# Patient Record
Sex: MALE
[De-identification: names, ages, dates, MRNs, and addresses within clinical notes are randomized per-mention and may not be internally consistent; named-entity substitution may affect disease eponyms.]

---

## 2023-04-06 ENCOUNTER — APPOINTMENT (OUTPATIENT)
Dept: NEUROLOGY | Facility: CLINIC | Age: 63
End: 2023-04-06

## 2023-08-10 ENCOUNTER — APPOINTMENT (OUTPATIENT)
Dept: UROLOGY | Facility: CLINIC | Age: 63
End: 2023-08-10
Payer: COMMERCIAL

## 2023-08-10 VITALS — HEART RATE: 69 BPM | SYSTOLIC BLOOD PRESSURE: 108 MMHG | DIASTOLIC BLOOD PRESSURE: 72 MMHG

## 2023-08-10 DIAGNOSIS — R39.12 POOR URINARY STREAM: ICD-10-CM

## 2023-08-10 DIAGNOSIS — N40.1 BENIGN PROSTATIC HYPERPLASIA WITH LOWER URINARY TRACT SYMPMS: ICD-10-CM

## 2023-08-10 DIAGNOSIS — R35.1 NOCTURIA: ICD-10-CM

## 2023-08-10 DIAGNOSIS — Z78.9 OTHER SPECIFIED HEALTH STATUS: ICD-10-CM

## 2023-08-10 DIAGNOSIS — N13.8 BENIGN PROSTATIC HYPERPLASIA WITH LOWER URINARY TRACT SYMPMS: ICD-10-CM

## 2023-08-10 PROCEDURE — 99204 OFFICE O/P NEW MOD 45 MIN: CPT

## 2023-08-10 PROCEDURE — 51798 US URINE CAPACITY MEASURE: CPT

## 2023-08-10 RX ORDER — ALLOPURINOL 200 MG/1
TABLET ORAL
Refills: 0 | Status: ACTIVE | COMMUNITY

## 2023-08-10 RX ORDER — TAMSULOSIN HYDROCHLORIDE 0.4 MG/1
0.4 CAPSULE ORAL
Qty: 30 | Refills: 3 | Status: ACTIVE | COMMUNITY
Start: 2023-08-10 | End: 1900-01-01

## 2023-08-11 LAB
APPEARANCE: CLEAR
BACTERIA: NEGATIVE /HPF
BILIRUBIN URINE: NEGATIVE
BLOOD URINE: NEGATIVE
CAST: 1 /LPF
COLOR: YELLOW
EPITHELIAL CELLS: 0 /HPF
GLUCOSE QUALITATIVE U: NEGATIVE MG/DL
KETONES URINE: NEGATIVE MG/DL
LEUKOCYTE ESTERASE URINE: NEGATIVE
MICROSCOPIC-UA: NORMAL
NITRITE URINE: NEGATIVE
PH URINE: 5.5
PROTEIN URINE: NEGATIVE MG/DL
RED BLOOD CELLS URINE: 1 /HPF
SPECIFIC GRAVITY URINE: 1.02
UROBILINOGEN URINE: 0.2 MG/DL
WHITE BLOOD CELLS URINE: 0 /HPF

## 2023-08-12 NOTE — HISTORY OF PRESENT ILLNESS
[FreeTextEntry1] : New BPH.  PVR 0.  Flomax.  PSA.  Follow-up 1 month.  Please refer to URO Consult note

## 2023-08-12 NOTE — LETTER BODY
[FreeTextEntry1] : Mel Burton MD [address], [place] [address] [phone]  Dear Dr. Burton,  Reason for Visit: BPH.  This is a 62 year-old gentleman with symptoms of BPH. Patient is here today for evaluation. Patient reports he has weak uroflow, frequency, and hesitancy. He denies any hematuria or urinary incontinence. His symptoms are aggravated by hydration. He denies any alleviating factors. He has not tried any medical therapy previously. He reports no pain. All other review of systems are negative. He has no cancer in his family medical history. He has no previous surgical history. Past medical history, family history and social history were inquired and were noncontributory to current condition. The patient does not use tobacco or drink alcohol. Medications and allergies were reviewed. He has no known allergies to medication.  On examination, the patient is a healthy-appearing gentleman in no acute distress. He is alert and oriented follows commands. He has normal mood and affect. He is normocephalic. Neck is supple. Oral no thrush Respirations are unlabored. His abdomen is soft and nontender. Bladder is nonpalpable. No CVA tenderness. Neurologically he is grossly intact. No peripheral edema. Skin without gross abnormality. He has normal male external genitalia. Normal meatus. Bilateral testes are descended intrascrotally and normal to palpation. On rectal examination, there is normal sphincter tone. The prostate is clinically benign without focal induration or nodularity.  Post-void residual on bladder scan today was 0 cc.  ASSESSMENT: BPH.   I counseled the patient on the various etiology of his symptoms. I discussed the natural history of BPH and the treatment options available. I discussed the options of conservative management with fluid in dietary restrictions, herbal therapy, medical therapy, and minimally invasive procedures. Risk and benefits were discussed. I answered his questions. I recommended he try Flomax. I discussed the potential side effects of the medication. I counseled the patient on its use and side effects. If the patient develops any side effects, the patient will discontinue the medication and contact me. He will repeat PSA and BMP to establish baseline. Risks and alternatives were discussed. I answered the patient questions. The patient will follow-up as directed and will contact me with any questions or concerns. Thank you for the opportunity to participate in the care of Mr. VELIZ. I will keep you updated on his progress.   Plan: Trial of Flomax. PSA. BMP. Follow up in 1 month.

## 2023-08-12 NOTE — ADDENDUM
[FreeTextEntry1] : Entered by Do Ayala, acting as scribe for Dr. Camacho Reed. The documentation recorded by the scribe accurately reflects the service I personally performed and the decisions made by me.

## 2023-08-12 NOTE — LETTER BODY
[FreeTextEntry1] : Mel Burton MD [address], [place] [address] [phone]  Dear Dr. Burton,  Reason for Visit: BPH.  This is a 62 year-old gentleman with symptoms of BPH. Patient is here today for evaluation. Patient reports he has weak uroflow, frequency, and hesitancy. He denies any hematuria or urinary incontinence. His symptoms are aggravated by hydration. He denies any alleviating factors. He has not tried any medical therapy previously. He reports no pain. All other review of systems are negative. He has no cancer in his family medical history. He has no previous surgical history. Past medical history, family history and social history were inquired and were noncontributory to current condition. The patient does not use tobacco or drink alcohol. Medications and allergies were reviewed. He has no known allergies to medication.  On examination, the patient is a healthy-appearing gentleman in no acute distress. He is alert and oriented follows commands. He has normal mood and affect. He is normocephalic. Neck is supple. Oral no thrush Respirations are unlabored. His abdomen is soft and nontender. Bladder is nonpalpable. No CVA tenderness. Neurologically he is grossly intact. No peripheral edema. Skin without gross abnormality. He has normal male external genitalia. Normal meatus. Bilateral testes are descended intrascrotally and normal to palpation. On rectal examination, there is normal sphincter tone. The prostate is clinically benign without focal induration or nodularity.  Post-void residual on bladder scan today was 0 cc.  ASSESSMENT: BPH.   I counseled the patient on the various etiology of his symptoms. I discussed the natural history of BPH and the treatment options available. I discussed the options of conservative management with fluid in dietary restrictions, herbal therapy, medical therapy, and minimally invasive procedures. Risk and benefits were discussed. I answered his questions. I recommended he try Flomax. I discussed the potential side effects of the medication. I counseled the patient on its use and side effects. If the patient develops any side effects, the patient will discontinue the medication and contact me. He will repeat PSA and BMP to establish baseline. Risks and alternatives were discussed. I answered the patient questions. The patient will follow-up as directed and will contact me with any questions or concerns. Thank you for the opportunity to participate in the care of Mr. VELIZ. I will keep you updated on his progress.   Plan: Trial of Flomax. PSA. BMP. Follow up in 1 month.

## 2024-03-13 ENCOUNTER — EMERGENCY (EMERGENCY)
Facility: HOSPITAL | Age: 64
LOS: 1 days | Discharge: HOME | End: 2024-03-13
Attending: EMERGENCY MEDICINE
Payer: COMMERCIAL

## 2024-03-13 VITALS
SYSTOLIC BLOOD PRESSURE: 116 MMHG | DIASTOLIC BLOOD PRESSURE: 79 MMHG | TEMPERATURE: 97 F | RESPIRATION RATE: 20 BRPM | OXYGEN SATURATION: 95 % | HEART RATE: 70 BPM | WEIGHT: 190.04 LBS

## 2024-03-13 LAB
ALBUMIN SERPL ELPH-MCNC: 4 G/DL — SIGNIFICANT CHANGE UP (ref 3.3–5)
ALP SERPL-CCNC: 63 U/L — SIGNIFICANT CHANGE UP (ref 40–120)
ALT FLD-CCNC: 17 U/L — SIGNIFICANT CHANGE UP (ref 10–45)
ANION GAP SERPL CALC-SCNC: 15 MMOL/L — SIGNIFICANT CHANGE UP (ref 5–17)
AST SERPL-CCNC: 28 U/L — SIGNIFICANT CHANGE UP (ref 10–40)
BASE EXCESS BLDV CALC-SCNC: 1.6 MMOL/L — SIGNIFICANT CHANGE UP (ref -2–3)
BASOPHILS # BLD AUTO: 0.05 K/UL — SIGNIFICANT CHANGE UP (ref 0–0.2)
BASOPHILS NFR BLD AUTO: 0.7 % — SIGNIFICANT CHANGE UP (ref 0–2)
BILIRUB SERPL-MCNC: 0.3 MG/DL — SIGNIFICANT CHANGE UP (ref 0.2–1.2)
BUN SERPL-MCNC: 16 MG/DL — SIGNIFICANT CHANGE UP (ref 7–23)
CA-I SERPL-SCNC: 1.2 MMOL/L — SIGNIFICANT CHANGE UP (ref 1.15–1.33)
CALCIUM SERPL-MCNC: 9.3 MG/DL — SIGNIFICANT CHANGE UP (ref 8.4–10.5)
CHLORIDE BLDV-SCNC: 105 MMOL/L — SIGNIFICANT CHANGE UP (ref 96–108)
CHLORIDE SERPL-SCNC: 104 MMOL/L — SIGNIFICANT CHANGE UP (ref 96–108)
CO2 BLDV-SCNC: 29 MMOL/L — HIGH (ref 22–26)
CO2 SERPL-SCNC: 20 MMOL/L — LOW (ref 22–31)
CREAT SERPL-MCNC: 1.1 MG/DL — SIGNIFICANT CHANGE UP (ref 0.5–1.3)
EGFR: 76 ML/MIN/1.73M2 — SIGNIFICANT CHANGE UP
EOSINOPHIL # BLD AUTO: 0.14 K/UL — SIGNIFICANT CHANGE UP (ref 0–0.5)
EOSINOPHIL NFR BLD AUTO: 1.9 % — SIGNIFICANT CHANGE UP (ref 0–6)
GAS PNL BLDV: 135 MMOL/L — LOW (ref 136–145)
GAS PNL BLDV: SIGNIFICANT CHANGE UP
GAS PNL BLDV: SIGNIFICANT CHANGE UP
GLUCOSE BLDV-MCNC: 93 MG/DL — SIGNIFICANT CHANGE UP (ref 70–99)
GLUCOSE SERPL-MCNC: 99 MG/DL — SIGNIFICANT CHANGE UP (ref 70–99)
HCO3 BLDV-SCNC: 28 MMOL/L — SIGNIFICANT CHANGE UP (ref 22–29)
HCT VFR BLD CALC: 42 % — SIGNIFICANT CHANGE UP (ref 39–50)
HCT VFR BLDA CALC: 45 % — SIGNIFICANT CHANGE UP (ref 39–51)
HGB BLD CALC-MCNC: 15 G/DL — SIGNIFICANT CHANGE UP (ref 12.6–17.4)
HGB BLD-MCNC: 14.5 G/DL — SIGNIFICANT CHANGE UP (ref 13–17)
IMM GRANULOCYTES NFR BLD AUTO: 0.3 % — SIGNIFICANT CHANGE UP (ref 0–0.9)
LACTATE BLDV-MCNC: 1.8 MMOL/L — SIGNIFICANT CHANGE UP (ref 0.5–2)
LYMPHOCYTES # BLD AUTO: 1.81 K/UL — SIGNIFICANT CHANGE UP (ref 1–3.3)
LYMPHOCYTES # BLD AUTO: 24.8 % — SIGNIFICANT CHANGE UP (ref 13–44)
MCHC RBC-ENTMCNC: 30.2 PG — SIGNIFICANT CHANGE UP (ref 27–34)
MCHC RBC-ENTMCNC: 34.5 GM/DL — SIGNIFICANT CHANGE UP (ref 32–36)
MCV RBC AUTO: 87.5 FL — SIGNIFICANT CHANGE UP (ref 80–100)
MONOCYTES # BLD AUTO: 0.74 K/UL — SIGNIFICANT CHANGE UP (ref 0–0.9)
MONOCYTES NFR BLD AUTO: 10.2 % — SIGNIFICANT CHANGE UP (ref 2–14)
NEUTROPHILS # BLD AUTO: 4.53 K/UL — SIGNIFICANT CHANGE UP (ref 1.8–7.4)
NEUTROPHILS NFR BLD AUTO: 62.1 % — SIGNIFICANT CHANGE UP (ref 43–77)
NRBC # BLD: 0 /100 WBCS — SIGNIFICANT CHANGE UP (ref 0–0)
NT-PROBNP SERPL-SCNC: <36 PG/ML — SIGNIFICANT CHANGE UP (ref 0–300)
PCO2 BLDV: 48 MMHG — SIGNIFICANT CHANGE UP (ref 42–55)
PH BLDV: 7.37 — SIGNIFICANT CHANGE UP (ref 7.32–7.43)
PLATELET # BLD AUTO: 218 K/UL — SIGNIFICANT CHANGE UP (ref 150–400)
PO2 BLDV: 47 MMHG — HIGH (ref 25–45)
POTASSIUM BLDV-SCNC: 4.7 MMOL/L — SIGNIFICANT CHANGE UP (ref 3.5–5.1)
POTASSIUM SERPL-MCNC: 4.5 MMOL/L — SIGNIFICANT CHANGE UP (ref 3.5–5.3)
POTASSIUM SERPL-SCNC: 4.5 MMOL/L — SIGNIFICANT CHANGE UP (ref 3.5–5.3)
PROT SERPL-MCNC: 7.2 G/DL — SIGNIFICANT CHANGE UP (ref 6–8.3)
RBC # BLD: 4.8 M/UL — SIGNIFICANT CHANGE UP (ref 4.2–5.8)
RBC # FLD: 12.5 % — SIGNIFICANT CHANGE UP (ref 10.3–14.5)
SAO2 % BLDV: 78.8 % — SIGNIFICANT CHANGE UP (ref 67–88)
SODIUM SERPL-SCNC: 139 MMOL/L — SIGNIFICANT CHANGE UP (ref 135–145)
TROPONIN T, HIGH SENSITIVITY RESULT: 9 NG/L — SIGNIFICANT CHANGE UP (ref 0–51)
WBC # BLD: 7.29 K/UL — SIGNIFICANT CHANGE UP (ref 3.8–10.5)
WBC # FLD AUTO: 7.29 K/UL — SIGNIFICANT CHANGE UP (ref 3.8–10.5)

## 2024-03-13 PROCEDURE — 72125 CT NECK SPINE W/O DYE: CPT | Mod: 26,MC

## 2024-03-13 PROCEDURE — 74177 CT ABD & PELVIS W/CONTRAST: CPT | Mod: 26,MC

## 2024-03-13 PROCEDURE — 99223 1ST HOSP IP/OBS HIGH 75: CPT

## 2024-03-13 PROCEDURE — 70450 CT HEAD/BRAIN W/O DYE: CPT | Mod: 26,MC,59

## 2024-03-13 PROCEDURE — 70498 CT ANGIOGRAPHY NECK: CPT | Mod: 26,MC

## 2024-03-13 PROCEDURE — 71045 X-RAY EXAM CHEST 1 VIEW: CPT | Mod: 26

## 2024-03-13 PROCEDURE — 70496 CT ANGIOGRAPHY HEAD: CPT | Mod: 26,MC

## 2024-03-13 RX ORDER — MECLIZINE HCL 12.5 MG
25 TABLET ORAL ONCE
Refills: 0 | Status: COMPLETED | OUTPATIENT
Start: 2024-03-13 | End: 2024-03-13

## 2024-03-13 RX ORDER — SODIUM CHLORIDE 9 MG/ML
1000 INJECTION INTRAMUSCULAR; INTRAVENOUS; SUBCUTANEOUS ONCE
Refills: 0 | Status: COMPLETED | OUTPATIENT
Start: 2024-03-13 | End: 2024-03-13

## 2024-03-13 RX ORDER — ACETAMINOPHEN 500 MG
1000 TABLET ORAL ONCE
Refills: 0 | Status: COMPLETED | OUTPATIENT
Start: 2024-03-13 | End: 2024-03-13

## 2024-03-13 RX ORDER — MECLIZINE HCL 12.5 MG
50 TABLET ORAL ONCE
Refills: 0 | Status: COMPLETED | OUTPATIENT
Start: 2024-03-13 | End: 2024-03-13

## 2024-03-13 RX ADMIN — Medication 25 MILLIGRAM(S): at 15:49

## 2024-03-13 RX ADMIN — Medication 1000 MILLIGRAM(S): at 18:37

## 2024-03-13 RX ADMIN — SODIUM CHLORIDE 1000 MILLILITER(S): 9 INJECTION INTRAMUSCULAR; INTRAVENOUS; SUBCUTANEOUS at 21:00

## 2024-03-13 RX ADMIN — Medication 50 MILLIGRAM(S): at 18:36

## 2024-03-13 RX ADMIN — SODIUM CHLORIDE 1000 MILLILITER(S): 9 INJECTION INTRAMUSCULAR; INTRAVENOUS; SUBCUTANEOUS at 15:49

## 2024-03-13 RX ADMIN — Medication 400 MILLIGRAM(S): at 15:49

## 2024-03-13 NOTE — ED PROVIDER NOTE - ATTENDING APP SHARED VISIT CONTRIBUTION OF CARE
I, Caio Gorman MD, Emergency Medicine Attending Physician, personally made/approve the management plan and take responsibility for the patient management.    MDM: 63 M w/ Hx of cervical stenosis s/p fusion who p/w dizziness/vertigo for last 3 days, waxes and wanes, but always present. Associated w/ unsteadiness, and pain/numbness to the back of the head. States bilateral arm numbness is present, but unchanged for several years.     ROS: no fevers, no chills, no vision changes, no chest pain, no palpitations, no shortness of breath, no abdominal pain, no vomiting, no weakness, no slurred speech, no fall/trauma    Exam: patient uncomfortable appearing, eyes closed for majority of evaluation, (+) mild horizontal nystagmus noted, but PERRLA. C-spine with surgical scar noted, no erythema or warmth or TTP. Head NCAT. Cardiac RRR, lungs CTAB, ABD: Abdomen soft, non-tender and non-distended, no rebound, no guarding, no rigidity. No CVA tenderness. NEURO: Mental status as above. Cranial nerves III-XII intact. Strength intact with 5/5 strength in all 4 extremities. Sensation intact to light touch in all 4 extremities. No pronator drift. No dysmetria with finger-nose-finger. Normal gait without ataxia. Normal balance and speech.     Concern for intracranial pathology including central vertigo, will obtain CT scan with CTA for LVO and cerebral/vertebral artery dissection. Out of the window for thrombolytic agents. Will obtain labs to evaluate for hematologic disorder, metabolic derangements, hepatic and renal function, and screen for cardiac pathology with EKG and troponin.     Signed out at 1600 to Dr. Leblanc pending full labs/imaging and close reassessments for further treatment and disposition decisions.

## 2024-03-13 NOTE — ED ADULT NURSE NOTE - NSFALLUNIVINTERV_ED_ALL_ED
Bed/Stretcher in lowest position, wheels locked, appropriate side rails in place/Call bell, personal items and telephone in reach/Instruct patient to call for assistance before getting out of bed/chair/stretcher/Non-slip footwear applied when patient is off stretcher/[place] to call system/Physically safe environment - no spills, clutter or unnecessary equipment/Purposeful proactive rounding/Room/bathroom lighting operational, light cord in reach

## 2024-03-13 NOTE — ED ADULT NURSE NOTE - OBJECTIVE STATEMENT
62 y/o male  arrives to the ER complaining of dizziness.  Pt reports having dizziness for 4 days, reports a constant spinning sensation. Pt endorses R arm numbness. Pt denies SOB, chest pain, dizziness, N/V/D, urinary symptoms, fevers, chills.  On assessment pt is well appearing, A&Ox4, speaking coherently, neuro intact, following commands, airway is patent, breathing spontaneously and unlabored. Skin is dry, warm. Abdomen is soft, no distended, no tender. Full ROM in all extremities. 62 y/o male  arrives to the ER complaining of dizziness.  Pt reports having dizziness for 4 days, reports a constant spinning sensation. Pt endorses R arm numbness. Pt denies SOB, chest pain, N/V/D, urinary symptoms, fevers, chills.  On assessment pt is well appearing, A&Ox4, speaking coherently, neuro intact, following commands, airway is patent, breathing spontaneously and unlabored. Skin is dry, warm. Abdomen is soft, no distended, no tender. Full ROM in all extremities. 64 y/o male with pmhx of cervical stenosis arrives to the ER complaining of dizziness.  Pt reports having dizziness for 4 days, reports a constant spinning sensation that varies in intensity. Pt endorses pain and numbness to back of his head, and bilateral arm numbness for more than a year. Pt denies SOB, chest pain, N/V/D, urinary symptoms, fevers, chills.  On assessment pt is well appearing, A&Ox4, speaking coherently, neuro intact, following commands, airway is patent, breathing spontaneously and unlabored. Skin is dry, warm. Abdomen is soft, no distended, no tender. Full ROM in all extremities.

## 2024-03-13 NOTE — ED PROVIDER NOTE - PHYSICAL EXAMINATION
CONSTITUTIONAL: Patient is awake, alert and oriented x 3.   HEAD: NCAT  EYES: PERRL bilaterally  ENT: Airway patent, Nasal mucosa clear.   NECK: Supple,   LUNGS: CTA B/L,   HEART: RRR.+S1S2   MSK: FROM upper and lower ext b/l,   SKIN: No rash or lesions  NEURO: CN 3-12 grossly intact, No focal deficits, Strength5/5 UE and LE b/l; Sensation intact; Gait normal, ambulatory in ED:

## 2024-03-13 NOTE — ED PROVIDER NOTE - PROGRESS NOTE DETAILS
Patient reassessed. Dizziness has not improved. Will try additional meclizine. Neurology consulted for evaluation. Will place patient in CDU for further care. CDU GIANLUCA Trevino notified. Daina Farooq PA-C Patient endorsed to the observation PA at the time of observation order placement.  Based on patient's history and physical exam, as well as the results of today's workup, I feel that patient warrants observation in the hospital for further workup/evaluation and continued management. I discussed the findings of today's workup with the patient and addressed the patient's questions and concerns. The patient was agreeable with observation. Our team spoke with the CDU receiving team who accepted the patient for observation and subsequently took over the patient's care at the time of order placement for observation.

## 2024-03-13 NOTE — ED PROVIDER NOTE - OBJECTIVE STATEMENT
64 y/o male with pmhx of cervical stenosis presents to the ED complaining of dizziness x 3 days. Patient states that dizziness is like the room is spinning. Dizziness comes and goes. Sometimes he feels as though he cannot walk. Also complains of pain/numbness to back of his head. Has never had these symptoms before. He states that he 62 y/o male with pmhx of cervical stenosis presents to the ED complaining of dizziness x 3 days. Patient states that dizziness is like the room is spinning. Dizziness comes and goes. Sometimes he feels as though he cannot walk. Also complains of pain/numbness to back of his head. Has never had these symptoms before. He states that he has had b/l numbness tingling to both UE for years. He has had prior surgery to cervical spine due to stenosis. Denies any fever, chills, chest pain, cough, abdominal pain, n/v/d. No weakness.     Mandarin  Used

## 2024-03-13 NOTE — ED PROVIDER NOTE - IV ALTEPLASE EXCL REL HIDDEN
Detail Level: Detailed show Quality 130: Documentation Of Current Medications In The Medical Record: Current Medications Documented Quality 226: Preventive Care And Screening: Tobacco Use: Screening And Cessation Intervention: Patient screened for tobacco use and is an ex/non-smoker

## 2024-03-14 VITALS
HEART RATE: 60 BPM | TEMPERATURE: 98 F | RESPIRATION RATE: 16 BRPM | DIASTOLIC BLOOD PRESSURE: 86 MMHG | SYSTOLIC BLOOD PRESSURE: 117 MMHG | OXYGEN SATURATION: 97 %

## 2024-03-14 LAB
A1C WITH ESTIMATED AVERAGE GLUCOSE RESULT: 5.2 % — SIGNIFICANT CHANGE UP (ref 4–5.6)
CHOLEST SERPL-MCNC: 188 MG/DL — SIGNIFICANT CHANGE UP
ESTIMATED AVERAGE GLUCOSE: 103 MG/DL — SIGNIFICANT CHANGE UP (ref 68–114)
HDLC SERPL-MCNC: 37 MG/DL — LOW
LIPID PNL WITH DIRECT LDL SERPL: 112 MG/DL — HIGH
NON HDL CHOLESTEROL: 151 MG/DL — HIGH
TRIGL SERPL-MCNC: 225 MG/DL — HIGH

## 2024-03-14 PROCEDURE — 99285 EMERGENCY DEPT VISIT HI MDM: CPT | Mod: 25

## 2024-03-14 PROCEDURE — 82330 ASSAY OF CALCIUM: CPT

## 2024-03-14 PROCEDURE — 85018 HEMOGLOBIN: CPT

## 2024-03-14 PROCEDURE — 70498 CT ANGIOGRAPHY NECK: CPT | Mod: MC

## 2024-03-14 PROCEDURE — 70450 CT HEAD/BRAIN W/O DYE: CPT | Mod: MC

## 2024-03-14 PROCEDURE — 84295 ASSAY OF SERUM SODIUM: CPT

## 2024-03-14 PROCEDURE — 70496 CT ANGIOGRAPHY HEAD: CPT | Mod: MC

## 2024-03-14 PROCEDURE — 93005 ELECTROCARDIOGRAM TRACING: CPT

## 2024-03-14 PROCEDURE — 83605 ASSAY OF LACTIC ACID: CPT

## 2024-03-14 PROCEDURE — 70551 MRI BRAIN STEM W/O DYE: CPT | Mod: MC

## 2024-03-14 PROCEDURE — 84484 ASSAY OF TROPONIN QUANT: CPT

## 2024-03-14 PROCEDURE — 84132 ASSAY OF SERUM POTASSIUM: CPT

## 2024-03-14 PROCEDURE — 82803 BLOOD GASES ANY COMBINATION: CPT

## 2024-03-14 PROCEDURE — 83036 HEMOGLOBIN GLYCOSYLATED A1C: CPT

## 2024-03-14 PROCEDURE — 72125 CT NECK SPINE W/O DYE: CPT | Mod: MC

## 2024-03-14 PROCEDURE — 83735 ASSAY OF MAGNESIUM: CPT

## 2024-03-14 PROCEDURE — 82435 ASSAY OF BLOOD CHLORIDE: CPT

## 2024-03-14 PROCEDURE — 99239 HOSP IP/OBS DSCHRG MGMT >30: CPT

## 2024-03-14 PROCEDURE — 71045 X-RAY EXAM CHEST 1 VIEW: CPT

## 2024-03-14 PROCEDURE — 80061 LIPID PANEL: CPT

## 2024-03-14 PROCEDURE — 85014 HEMATOCRIT: CPT

## 2024-03-14 PROCEDURE — 70551 MRI BRAIN STEM W/O DYE: CPT | Mod: 26,MC

## 2024-03-14 PROCEDURE — 74177 CT ABD & PELVIS W/CONTRAST: CPT | Mod: MC

## 2024-03-14 PROCEDURE — 96374 THER/PROPH/DIAG INJ IV PUSH: CPT | Mod: XU

## 2024-03-14 PROCEDURE — 83880 ASSAY OF NATRIURETIC PEPTIDE: CPT

## 2024-03-14 PROCEDURE — G0378: CPT

## 2024-03-14 PROCEDURE — 82947 ASSAY GLUCOSE BLOOD QUANT: CPT

## 2024-03-14 PROCEDURE — 80053 COMPREHEN METABOLIC PANEL: CPT

## 2024-03-14 PROCEDURE — 85025 COMPLETE CBC W/AUTO DIFF WBC: CPT

## 2024-03-14 RX ORDER — MECLIZINE HCL 12.5 MG
1 TABLET ORAL
Qty: 20 | Refills: 0
Start: 2024-03-14 | End: 2024-03-23

## 2024-03-14 RX ORDER — MECLIZINE HCL 12.5 MG
12.5 TABLET ORAL EVERY 12 HOURS
Refills: 0 | Status: DISCONTINUED | OUTPATIENT
Start: 2024-03-14 | End: 2024-03-14

## 2024-03-14 RX ORDER — DIAZEPAM 5 MG
5 TABLET ORAL ONCE
Refills: 0 | Status: DISCONTINUED | OUTPATIENT
Start: 2024-03-14 | End: 2024-03-14

## 2024-03-14 RX ADMIN — Medication 5 MILLIGRAM(S): at 10:04

## 2024-03-14 NOTE — ED CDU PROVIDER SUBSEQUENT DAY NOTE - NS ED ROS FT
· Review of Systems: +dizziness  · NEUROLOGICAL: - - -  · Neurological [+]: +numbness  · ROS STATEMENT: all other ROS negative except as per HPI

## 2024-03-14 NOTE — ED CDU PROVIDER INITIAL DAY NOTE - DETAILS
tele, neuro checks  neurology consult pending  vitals every 4 hours, frequent reevaluations   DW ED team, vitals every 4 hours, frequent reevaluations

## 2024-03-14 NOTE — ED CDU PROVIDER INITIAL DAY NOTE - NEUROLOGICAL, MLM
Alert and oriented, strength 5/5 bilateral UE/LE, sensation grossly intact, clear speech, follows commands

## 2024-03-14 NOTE — CONSULT NOTE ADULT - SUBJECTIVE AND OBJECTIVE BOX
Neurology - Consult Note    -  Spectra: [phone] ([facility]), [phone] ([facility])  -    HPI: Patient EUNICE VELIZ is a 63y (1960) man with a PMHx significant for cervical stenosis -cervical fusion surgery done 11/2023, obesity (lost 50 lbs overall this yr), chronic neck and back pain, tingling and numbness in his bilateral UEs for several yrs >10 yrs, who presents for 3-day hx of dizziness that comes and goes with head tilt. He also has endorsed some neck pain, posterior. He states he had his cervical fusion done 11/2023 and tolerated procedure well in [place]. He reports that he has no pain overall currently, but the dizziness is room spinning, vertigo. He states it tends to happen when he gets in and out of bed in the morning and lasts for a few seconds but is significant. He had a CT/CTA head and neck done which was unremarkable. His CT Spine had shown some changes 2/2 to his cervical fusion surgery. Otherwise unremarkable. JAVIER Hallpike + on R side.    Review of Systems: NEUROLOGICAL: +As stated in HPI above   All other review of systems is negative unless indicated above.    Allergies:  No Known Allergies      PMHx/PSHx/Family Hx: As above, otherwise see below   No pertinent past medical history        Social Hx:  No current use of tobacco, alcohol, or illicit drugs    Medications:  MEDICATIONS  (STANDING):    MEDICATIONS  (PRN):      Vitals:  T(C): 36.9 (03-14-24 @ 00:09), Max: 36.9 (03-14-24 @ 00:09)  HR: 72 (03-14-24 @ 00:09) (66 - 72)  BP: 124/71 (03-14-24 @ 00:09) (114/74 - 124/71)  RR: 16 (03-14-24 @ 00:09) (16 - 20)  SpO2: 95% (03-14-24 @ 00:09) (95% - 100%)    Physical Examination:   General - NAD    Mental status - Awake, Alert, Oriented to person, place, and time. Speech fluent, repetition and naming intact. Follows simple and complex commands. Attention/concentration, recent and remote memory (including registration and recall), and fund of knowledge intact    Cranial nerves - PERRLA, VFF, EOMI, face sensation (V1-V3) intact b/l, facial strength intact without asymmetry b/l, hearing intact b/l, palate with symmetric elevation, trapezius 5/5 strength b/l, tongue midline on protrusion with full lateral movement    Motor - Normal bulk and tone throughout. No pronator drift.  Strength testing            Deltoid      Biceps      Triceps     Wrist Extension    Wrist Flexion     Interossei         R            5                 5               5                     5                              5                        5                 5  L             5                 5               5                     5                              5                        5                 5              Hip Flexion    Hip Extension    Knee Flexion    Knee Extension    Dorsiflexion    Plantar Flexion  R              5                           5                       5                           5                            5                          5  L              5                           5                        5                           5                            5                          5    Sensation - Light touch intact throughout    DTR's -             Biceps      Triceps     Brachioradialis      Patellar    Ankle    Toes/plantar response  R             2+             2+                  2+                       2+            2+                 Down  L              2+             2+                 2+                        2+           2+                 Down    Coordination - Finger to Nose intact b/l. No tremors appreciated    Gait and station - Normal casual gait. Romberg (-)    Labs:                        14.5   7.29  )-----------( 218      ( 13 Mar 2024 16:22 )             42.0     03-13    139  |  104  |  16  ----------------------------<  99  4.5   |  20<L>  |  1.10    Ca    9.3      13 Mar 2024 16:22  Mg     2.2     03-13    TPro  7.2  /  Alb  4.0  /  TBili  0.3  /  DBili  x   /  AST  28  /  ALT  17  /  AlkPhos  63  03-13    CAPILLARY BLOOD GLUCOSE        LIVER FUNCTIONS - ( 13 Mar 2024 16:22 )  Alb: 4.0 g/dL / Pro: 7.2 g/dL / ALK PHOS: 63 U/L / ALT: 17 U/L / AST: 28 U/L / GGT: x           Radiology:  CT Head No Cont:  (13 Mar 2024 17:28)  CT HEAD:  Gray/white matter differentiation is preserved. No acute intracranial   hemorrhage.    CT CERVICAL SPINE:  No acute fracture or traumatic subluxation.  Multi-level degenerative changes with cervical hardware as described.    CTA NECK:  No evidence of significant stenosis or occlusion.    CTA HEAD:  Patent intracranial circulation without flow limiting stenosis.  No evidence of aneurysm.

## 2024-03-14 NOTE — ED CDU PROVIDER DISPOSITION NOTE - PATIENT PORTAL LINK FT
You can access the FollowMyHealth Patient Portal offered by [facility] by registering at the following website: [email or web address]. By joining [facility]’s FollowMyHealth portal, you will also be able to view your health information using other applications (apps) compatible with our system.

## 2024-03-14 NOTE — ED CDU PROVIDER INITIAL DAY NOTE - OBJECTIVE STATEMENT
62 y/o male with pmhx of cervical stenosis s/p surgery and gout presents to the ED complaining of dizziness x 4-5 days. Dizziness is intermittent, room spinning, and associated with unsteady gait. Also complains of pain/numbness to back of his head. Reports bilateral upper extremity numbness that he has had in the past but reports worsening symptoms. He also endorses numbness to bilateral knees for days.   Denies any fever, chest pain, shortness of breath, vomiting, weakness.   Mandarin  Used [id number] BULX and [id number] [employer] 62 y/o male with pmhx of cervical stenosis s/p surgery and gout presents to the ED complaining of dizziness x 4-5 days. Dizziness is intermittent, room spinning, and associated with unsteady gait. Also complains of pain/numbness to back of his head. Reports bilateral upper extremity numbness that he has had in the past but reports worsening. He also endorses numbness to bilateral knees for days, as well as intermittent lower extremity swelling which he has had in the past.   Denies any fever, chest pain, shortness of breath, vomiting, weakness.   Mandarin  Used [id number] [employer] and [id number] [employer]  ED course: CT/CTA head/neck negative for acute pathology. Patient with some improvement, although still with dizziness. Plan for neurology consult and symptomatic control in CDU.

## 2024-03-14 NOTE — ED ADULT NURSE REASSESSMENT NOTE - NSFALLHARMRISKINTERV_ED_ALL_ED
Communicate risk of Fall with Harm to all staff, patient, and family/Provide visual cue: red socks, yellow wristband, yellow gown, etc/Reinforce activity limits and safety measures with patient and family/Bed in lowest position, wheels locked, appropriate side rails in place/Call bell, personal items and telephone in reach/Instruct patient to call for assistance before getting out of bed/chair/stretcher/Non-slip footwear applied when patient is off stretcher/[place] to call system/Physically safe environment - no spills, clutter or unnecessary equipment/Purposeful Proactive Rounding/Room/bathroom lighting operational, light cord in reach
Assistance OOB with selected safe patient handling equipment if applicable/Assistance with ambulation/Communicate risk of Fall with Harm to all staff, patient, and family/Encourage patient to sit up slowly, dangle for a short time, stand at bedside before walking/Monitor gait and stability/Orthostatic vital signs/Provide visual cue: red socks, yellow wristband, yellow gown, etc/Reinforce activity limits and safety measures with patient and family/Bed in lowest position, wheels locked, appropriate side rails in place/Call bell, personal items and telephone in reach/Instruct patient to call for assistance before getting out of bed/chair/stretcher/Non-slip footwear applied when patient is off stretcher/[place] to call system/Physically safe environment - no spills, clutter or unnecessary equipment/Purposeful Proactive Rounding/Room/bathroom lighting operational, light cord in reach

## 2024-03-14 NOTE — CONSULT NOTE ADULT - ASSESSMENT
EUNICE VELIZ is a 63y (1960) man with a PMHx significant for cervical stenosis -cervical fusion surgery done 11/2023, obesity (lost 50 lbs overall this yr), chronic neck and back pain, tingling and numbness in his bilateral UEs for several yrs >10 yrs, who presents for 3-day hx of dizziness that comes and goes with head tilt. He also has endorsed some neck pain, posterior. He states he had his cervical fusion done 11/2023 and tolerated procedure well in [place]. He reports that he has no pain overall currently, but the dizziness is room spinning, vertigo. He states it tends to happen when he gets in and out of bed in the morning and lasts for a few seconds but is significant. He had a CT/CTA head and neck done which was unremarkable. His CT Spine had shown some changes 2/2 to his cervical fusion surgery. Otherwise unremarkable. JAVIER Hallpike + on R side.    IMPRESSION: vertigo, concerning for peripheral vertigo/cervicogenic vertigo given hx of cervical spine stenosis/pain/surgery.    RECOMMENDATIONS:  [] admit to cdu  [] mri brain w/o contrast  [] outpatient motion ct cervical spine can be considered  [] would recommend orthostatics  [] meclizine 12.5mg BID PRN (give one now).  [] Reglan 4mg  PRN Q8H  [] Refer for Vestibular Rehab on discharge  [] ENT eval on discharge  [] Neurology f/u outpatient: Patient can follow up with general neurology at [address] [place] [address] 1-2 weeks after discharge. Please instruct the patient to call [phone] to schedule this appointment.    case d/w stroke fellow under supervision of stroke attending    EUNICE VELIZ is a 63y (1960) man with a PMHx significant for cervical stenosis -cervical fusion surgery done 11/2023, obesity (lost 50 lbs overall this yr), chronic neck and back pain, tingling and numbness in his bilateral UEs for several yrs >10 yrs, who presents for 3-day hx of dizziness that comes and goes with head tilt. He also has endorsed some neck pain, posterior. He states he had his cervical fusion done 11/2023 and tolerated procedure well in [place]. He reports that he has no pain overall currently, but the dizziness is room spinning, vertigo. He states it tends to happen when he gets in and out of bed in the morning and lasts for a few seconds but is significant. He had a CT/CTA head and neck done which was unremarkable. His CT Spine had shown some changes 2/2 to his cervical fusion surgery. Otherwise unremarkable. JAVIER Hallpike + on R side.    IMPRESSION: vertigo, concerning for peripheral vertigo/cervicogenic vertigo given hx of cervical spine stenosis/pain/surgery.    RECOMMENDATIONS:  [] admit to cdu  [] mri brain w/o contrast  [] outpatient motion ct cervical spine can be considered  [] would recommend orthostatics  [] meclizine 12.5mg BID PRN (give one now).  [] Reglan 4mg  PRN Q8H  [] Refer for Vestibular Rehab on discharge  [] ENT eval on discharge  [] Neurology f/u outpatient: Patient can follow up with  neurology at [phone] with Dr. Chante castro d/w stroke fellow under supervision of stroke attending

## 2024-03-14 NOTE — ED CDU PROVIDER DISPOSITION NOTE - CLINICAL COURSE
64 y/o male with pmhx of cervical stenosis s/p surgery and gout presents to the ED complaining of dizziness x 4-5 days. Dizziness is intermittent, room spinning, and associated with unsteady gait. Also complains of pain/numbness to back of his head. Reports bilateral upper extremity numbness that he has had in the past but reports worsening. He also endorses numbness to bilateral knees for days, as well as intermittent lower extremity swelling which he has had in the past.   	Denies any fever, chest pain, shortness of breath, vomiting, weakness.   	[employer]  Used [id number] [employer] and [id number] [employer]  ED course: CT/CTA head/neck negative for acute pathology. Patient with some improvement, although still with dizziness. Plan for neurology consult and symptomatic control in CDU. 64 y/o male with pmhx of cervical stenosis s/p surgery and gout presents to the ED complaining of dizziness x 4-5 days. Dizziness is intermittent, room spinning, and associated with unsteady gait. Also complains of pain/numbness to back of his head. Reports bilateral upper extremity numbness that he has had in the past but reports worsening. He also endorses numbness to bilateral knees for days, as well as intermittent lower extremity swelling which he has had in the past.   	Denies any fever, chest pain, shortness of breath, vomiting, weakness.   	[employer]  Used [id number] [employer] and [id number] [employer]  ED course: CT/CTA head/neck negative for acute pathology. Patient with some improvement, although still with dizziness. Plan for neurology consult and symptomatic control in CDU.  In the CDU, pts VSS. No dizziness at present. Ambulating unassisted with steady gait. MRI negative for acute pathology. Neuro recs appreciated and relayed to pt. Cleared by neuro for outpt f/u. Meclizine to be sent to pharmacy. Copy of results given. Patient stable for discharge. [employer]  used: [id number].

## 2024-03-14 NOTE — ED CDU PROVIDER DISPOSITION NOTE - NSFOLLOWUPINSTRUCTIONS_ED_ALL_ED_FT
Hydrate.     You can take Meclizine 12.5mg twice a day as needed for dizziness.     You may be contacted by our Emergency Department Referrals Coordinator to set up your follow up appointment within 24-48 hours of your discharge Monday- Friday: ENT and Vestibular Rehab.     We recommend you follow up with your primary care provider within the next 2-3 days, please bring all of your results with you.     You can also follow up with general neurology at [address] [place] [address] 1-2 weeks after discharge. Call [phone] to schedule this appointment.    Please return to the Emergency Department with new, worsening, or concerning symptoms, such as:  -Shortness of breath or trouble breathing  -Pressure, pain, tightness in chest  -Facial drooping, arm weakness, or speech difficulty   -Head injury or loss of consciousness   -Nonstop bleeding or an open wound     *More detailed information regarding your visit and discharge can be found by reviewing this packet Please make sure to follow up with your primary care doctor within 1-2 days and with the NEUROLOGY specialist. The information for follow up can be found below. Bring a copy of all of your results with you to your follow up appointments.   Return to the ER as discussed if you develop any new or worsening symptoms.       [address]   Call [phone]     You can take Meclizine 25mg twice a day as needed for dizziness.

## 2024-03-14 NOTE — ED CDU PROVIDER INITIAL DAY NOTE - PROGRESS NOTE DETAILS
Patient endorse pain to left upper lateral abdomen pain with "lump" which he has had intermittently for years. Patient with some tenderness to area, otherwise abdomen soft, nontender. Evaluated patient with Dr. Leblanc, CT ordered. - Paula Meza PA-C Patient endorse pain to left upper lateral abdomen/rib pain with "lump" which he has had intermittently for years. Patient with some tenderness to area, otherwise abdomen soft, nontender. Evaluated patient with Dr. Leblanc, CT ordered. - Paula Meza PA-C

## 2024-03-14 NOTE — ED CDU PROVIDER SUBSEQUENT DAY NOTE - PROGRESS NOTE DETAILS
Patient seen at bedside in NAD.  VSS.  Patient resting comfortably. No dizziness at present. Ambulating unassisted with steady gait. MRI negative for acute pathology. Neuro recs appreciated and relayed to pt. Cleared by neuro for outpt f/u. Meclizine to be sent to pharmacy. Copy of results given. Patient stable for discharge.  Follow up instructions given, return to ED precautions reviewed. Importance of follow up emphasized, patient verbalized understanding.  All questions answered. [employer]  used: [id number]. Case dw Dr. Kaba. dEward Avila PA-C

## 2024-03-14 NOTE — CONSULT NOTE ADULT - ATTENDING COMMENTS
DOS 3/14  seen in ED/CDU  Briefly   64 yo M with cervical stenosis s/p cervical fusion surgery done 11/2023, obesity (lost 50 lbs overall this yr), chronic neck and back pain, tingling and numbness in his bilateral UEs for several yrs >10 yrs, who presents for 3-day hx of dizziness that comes and goes with head tilt. He also has endorsed some neck pain, posterior. He states he had his cervical fusion done 11/2023 and tolerated procedure well in [place]. He reports that he has no pain overall currently, but the dizziness is room spinning, vertigo. He states it tends to happen when he gets in and out of bed in the morning and lasts for a few seconds but is significant.   CT/CTA head and neck done which was unremarkable.   CT Spine had shown some changes 2/2 to his cervical fusion surgery. Otherwise unremarkable.   MRI brain unremarkable. no new or old strokes   A1c 5.2          IMPRESSION: vertigo, concerning for peripheral vertigo/cervicogenic vertigo given hx of cervical spine stenosis/pain/surgery.  stroke ruled out     RECOMMENDATIONS:  - meclizine 25mg PRN if ineffective valium 5mg BID PRN  - ENT outpatient   - reglan PRN nausea/vomitting   - vestibular therapy outpatient    - check FS, glucose control <180  - GI/DVT ppx  - Counseling on diet, exercise, and medication adherence was done  - Counseling on smoking cessation and alcohol consumption offered when appropriate.  - Pain assessed and judicious use of narcotics when appropriate was discussed.    - Stroke education given when appropriate.  - Importance of fall prevention discussed.   - Differential diagnosis and plan of care discussed with patient and/or family and primary team  - Thank you for allowing me to participate in the care of this patient. Call with questions.   no neuro/stroke objection to d/c planning if he is improved ; spoke with CDU team  outpatient f/u with me on discharge  Umesh Sharif MD  Vascular Neurology  Office: [phone]

## 2024-03-14 NOTE — ED CDU PROVIDER SUBSEQUENT DAY NOTE - CLINICAL SUMMARY MEDICAL DECISION MAKING FREE TEXT BOX
Angi: Patient with dizziness and numbness to back of head and neck. moving all extremiteis. dizziness still present. neuro evaluating patient. pending MRI results. reasess. treating with mclizine and valium. ambulating with no difficulty.

## 2024-03-14 NOTE — ED CDU PROVIDER SUBSEQUENT DAY NOTE - HISTORY
No interval changes since initial CDU provider note. Pt without new complaint. NAD VSS. no events on tele. Pending MRI in the setting of dizziness, neurology following.  - GIANLUCA Meza

## 2024-03-14 NOTE — ED CDU PROVIDER SUBSEQUENT DAY NOTE - PHYSICAL EXAMINATION
· Physical Examination: BILATERAL KNEES: No obvious deformities, moving without difficulty  · CONSTITUTIONAL: Well appearing, awake, alert, oriented to person, place, time/situation and in no apparent distress.  · ENMT: Airway patent.  · EYES: Clear bilaterally, EOMI  · CARDIAC: Normal rate, regular rhythm.  Heart sounds S1, S2.  · RESPIRATORY: CTAB  · GASTROINTESTINAL: Abdomen soft, +left upper lateral abdominal tenderness  · MUSCULOSKELETAL: Steady gait, no calf tenderness  · NEUROLOGICAL: Alert and oriented, strength 5/5 bilateral UE/LE, sensation grossly intact, clear speech, follows commands  · SKIN: No visible rash  · PSYCHIATRIC: normal mood and affect.

## 2024-03-14 NOTE — ED ADULT NURSE REASSESSMENT NOTE - NS ED NURSE REASSESS COMMENT FT1
10.45  Pt is evaluated by CDU /Neuro team  pt is feeling better with dizziness Pt is ambulatory without dizziness  .  Pt is discharged . Anne Leon   reevaluated the pt & explained the follow up care &  the printed the discharge summary  . Pt has stable vitals steady gait A&OX 4 at the time of discharge. Pt is stable to go home
Received pt Blue area, alert and orientedX4. Pt ambulatory. Pt observed in no distress. Breathing easy and non labored. Pt's gait steady. Pt hemodynamically stable.
Pt received from RN Mimi Do at 0000. Pt is Mandarin speaking,  [employer] [id number] used. Pt oriented to CDU and plan of care was discussed. Pt is observed for dizziness, cardiac monitoring on tele, receiving neuro checks, pending MRI of head. Neuro check performed, see flow sheet. Neuro intact, but has had b/l UE numbness x 1 year. Pt states dizziness has improved since earlier. A&Ox4, obeys commands, ambulatory, independent. Denies HA, blurry vision. Respirations spontaneous and unlabored. Denies SOB, dyspnea, cough, CP, palpitations. On CM, sinus bradycardia, asymptomatic, HR 57-59. 20G L AC patent, no signs of infiltration noted. Pt afebrile, denies chills. Pt resting in bed. Safety & comfort measures maintained. Call bell within reach.
07.00 Received the Pt from  IESHA Turner Pt is Observed for Dizziness for MRI Received the Pt A&OX 4  Pt obeys commands Vane N/V/D fever chills cp SOB   Comfort care & safety measures continued  IV site looks clean & dry no signs of infiltration noted pt denies  pain IV site .Pt is oriented to the unit Plan of care explained .  Pt is advised to call for help  call bell with in the reach pt verbalized the understanding .  Evaluated by  CDU  MD Angi Odonnell. GCS 15/15 A&OX 4 PERRLA  size 3 Strong upper & lower extremities steady gait  Pt is ambulatory & independent   No facial droop  No Hand Leg drop denies numbness tingling   Plan of care ongoing

## 2024-09-17 NOTE — ED ADULT TRIAGE NOTE - SOURCE OF INFORMATION
Patient/Spouse 67 yr old female, history of AFib (on metoprolol and eliquis), presents to the Emergency Department w headache. pt sp head injury two months ago. tripped and fell after feeling lightheaded in setting of run of afib. notes he has these episodes at times and follows closely w cards. was not seen following episode. +heasdtrike, no loc. since this injury has had intermittent headaches, dizziness. generalized headaches that last a few days at a time. pressure behind eyes. sensation of room spinning dizziness on occasion, worse w movement of head / position changes when he is having it. headaches more frequent and more severe so came for eval. no new trauma. no infectious symptoms. no neck pain, vision changes, vomiting, extremity weakness / numbness, speech or gait changes.